# Patient Record
Sex: FEMALE | Race: WHITE | Employment: FULL TIME | URBAN - METROPOLITAN AREA
[De-identification: names, ages, dates, MRNs, and addresses within clinical notes are randomized per-mention and may not be internally consistent; named-entity substitution may affect disease eponyms.]

---

## 2017-04-06 ENCOUNTER — EMPLOYEE WELLNESS (OUTPATIENT)
Dept: OTHER | Age: 37
End: 2017-04-06

## 2017-04-06 LAB
CHOLESTEROL, TOTAL: 173 MG/DL (ref 0–199)
GLUCOSE BLD-MCNC: 86 MG/DL (ref 70–99)
HDLC SERPL-MCNC: 69 MG/DL (ref 40–60)
LDL CHOLESTEROL CALCULATED: 92 MG/DL
TRIGL SERPL-MCNC: 62 MG/DL (ref 0–150)

## 2018-03-20 VITALS — BODY MASS INDEX: 27.76 KG/M2 | WEIGHT: 172 LBS

## 2018-04-12 ENCOUNTER — EMPLOYEE WELLNESS (OUTPATIENT)
Dept: OTHER | Age: 38
End: 2018-04-12

## 2018-04-12 LAB
CHOLESTEROL, TOTAL: 207 MG/DL (ref 0–199)
GLUCOSE BLD-MCNC: 79 MG/DL (ref 70–99)
HDLC SERPL-MCNC: 70 MG/DL (ref 40–60)
LDL CHOLESTEROL CALCULATED: 124 MG/DL
TRIGL SERPL-MCNC: 67 MG/DL (ref 0–150)

## 2018-04-16 VITALS — WEIGHT: 181 LBS | BODY MASS INDEX: 29.21 KG/M2

## 2019-05-14 ENCOUNTER — EMPLOYEE WELLNESS (OUTPATIENT)
Dept: OTHER | Age: 39
End: 2019-05-14

## 2019-05-14 LAB
CHOLESTEROL, TOTAL: 184 MG/DL (ref 0–199)
GLUCOSE BLD-MCNC: 97 MG/DL (ref 70–99)
HDLC SERPL-MCNC: 60 MG/DL (ref 40–60)
LDL CHOLESTEROL CALCULATED: 112 MG/DL
TRIGL SERPL-MCNC: 62 MG/DL (ref 0–150)

## 2019-05-28 VITALS — BODY MASS INDEX: 29.05 KG/M2 | WEIGHT: 180 LBS

## 2020-03-23 ENCOUNTER — OFFICE VISIT (OUTPATIENT)
Dept: ORTHOPEDIC SURGERY | Age: 40
End: 2020-03-23
Payer: COMMERCIAL

## 2020-03-23 VITALS — TEMPERATURE: 98.6 F | HEIGHT: 65 IN | WEIGHT: 187 LBS | BODY MASS INDEX: 31.16 KG/M2

## 2020-03-23 PROCEDURE — 99203 OFFICE O/P NEW LOW 30 MIN: CPT | Performed by: ORTHOPAEDIC SURGERY

## 2020-03-23 PROCEDURE — L1902 AFO ANKLE GAUNTLET PRE OTS: HCPCS | Performed by: ORTHOPAEDIC SURGERY

## 2020-03-23 RX ORDER — NAPROXEN 500 MG/1
500 TABLET ORAL 2 TIMES DAILY WITH MEALS
Qty: 60 TABLET | Refills: 0 | Status: SHIPPED | OUTPATIENT
Start: 2020-03-23 | End: 2020-04-22

## 2020-03-23 ASSESSMENT — ENCOUNTER SYMPTOMS
ALLERGIC/IMMUNOLOGIC NEGATIVE: 1
GASTROINTESTINAL NEGATIVE: 1
COUGH: 1
EYES NEGATIVE: 1

## 2020-03-23 NOTE — PROGRESS NOTES
Subjective:      Patient ID: Juan Fitzgerald is a 44 y.o. female. DAVID Fitzgerald presents today for evaluation of a left ankle injury. She was injured in January. She rolled her left ankle. She used some ice and an Ace wrap initially. She has not taken any medicine for this. She occasionally limps when she gets up. She says it feels like her ankle needs to pop. She has lateral pain and swelling. She tried to work out and cannot do that. In January pain was 7 out of 10. Currently pain is 4-5 out of 10 with activity but she has no pain at rest.  She has a history of a left foot fracture in 2010. She works in the [employer] at [employer].  She is otherwise healthy. Review of Systems   Constitutional: Negative. HENT: Negative. Eyes: Negative. Respiratory: Positive for cough. Cardiovascular: Negative. Gastrointestinal: Negative. Endocrine: Negative. Genitourinary: Negative. Musculoskeletal: Positive for arthralgias. Left ankle pain    Skin: Negative. Allergic/Immunologic: Negative. Neurological: Negative. Hematological: Negative. Psychiatric/Behavioral: Negative. Objective:   Physical Exam  General Exam:    Vitals: Temperature 98.6 °F (37 °C), temperature source Temporal, height 5' 5\" (1.651 m), weight 187 lb (84.8 kg). Constitutional: Patient is adequately groomed with no evidence of malnutrition  Mental Status: The patient is oriented to time, place and person. The patient's mood and affect are appropriate. Gait:  Patient walks with slight hesitation in her gait. Lymphatic: The lymphatic examination bilaterally reveals all areas to be without enlargement or induration. Vascular: Examination reveals no swelling or calf tenderness. Peripheral pulses are palpable and 2+. Neurological: The patient has good coordination. There is no weakness or sensory deficit. Skin:    Head/Neck: inspection reveals no rashes, ulcerations or lesions.   Trunk: inspection reveals no rashes, ulcerations or lesions. Right Lower Extremity: inspection reveals no rashes, ulcerations or lesions. Left Lower Extremity: inspection reveals no rashes, ulcerations or lesions. Right ankle exam today he has no tenderness or swelling. She has full range of motion. She has no pain with single leg stance or toe raise. She has normal strength. Neurologic and vascular exams are normal.  Calf is soft. On the left side she has fullness laterally. She has some mild tenderness over the ATFL and mild tenderness over the peroneal tendons. She has mild discomfort posteriorly near the Achilles. She has difficulty with single-leg toe raise. He is moderate discomfort with anterior drawer testing but no gross instability. Calf is soft. Neurologic and vascular exams are normal.    Left ankle x-rays obtained today AP, lateral, and mortise views demonstrate: No bony abnormalities    Assessment:      Residual of left ankle sprain      Plan:      She will take Naprosyn for the week. She will be fit with a brace. She was instructed in home exercises. She understands that she will likely have some swelling for several more months. To follow-up with me on an as-needed basis. Procedures    ASO Jewels Ankle Brace     Patient was prescribed an ASO JEWELS Ankle Brace. The left ankle will require stabilization / immobilization from this semi-rigid / rigid orthosis to improve their function. The orthosis will assist in protecting the affected area, provide functional support and facilitate healing. Patient was instructed to progress ambulation weight bearing as tolerated in the device. The patient was educated and fit by a healthcare professional with expert knowledge and specialization in brace application while under the direct supervision of the treating physician. Verbal and written instructions for the use of and application of this item were provided.    They were instructed to contact the office immediately should the brace result in increased pain, decreased sensation, increased swelling or worsening of the condition. This note was created using voice recognition software. It has been proofread, but occasionally errors remain. Please disregard these errors. They will be corrected as they are noted.

## 2020-07-06 ENCOUNTER — HOSPITAL ENCOUNTER (OUTPATIENT)
Dept: WOMENS IMAGING | Age: 40
Discharge: HOME | End: 2020-07-06
Payer: COMMERCIAL

## 2020-07-06 ENCOUNTER — OFFICE VISIT (OUTPATIENT)
Dept: PRIMARY CARE CLINIC | Age: 40
End: 2020-07-06
Payer: COMMERCIAL

## 2020-07-06 PROCEDURE — 77067 SCR MAMMO BI INCL CAD: CPT

## 2020-07-06 PROCEDURE — 99211 OFF/OP EST MAY X REQ PHY/QHP: CPT | Performed by: INTERNAL MEDICINE

## 2020-07-06 NOTE — PROGRESS NOTES
Christel Lyman received a viral test for COVID-19. They were educated on isolation and quarantine as appropriate. For any symptoms, they were directed to seek care from their PCP, given contact information to establish with a doctor, directed to an urgent care or the emergency room.

## 2020-07-08 LAB
SARS-COV-2: NOT DETECTED
SOURCE: NORMAL

## 2020-08-11 ENCOUNTER — EMPLOYEE WELLNESS (OUTPATIENT)
Dept: OTHER | Age: 40
End: 2020-08-11

## 2020-08-11 LAB
CHOLESTEROL, TOTAL: 194 MG/DL (ref 0–199)
GLUCOSE BLD-MCNC: 88 MG/DL (ref 70–99)
HDLC SERPL-MCNC: 55 MG/DL (ref 40–60)
LDL CHOLESTEROL CALCULATED: 129 MG/DL
TRIGL SERPL-MCNC: 48 MG/DL (ref 0–150)

## 2020-10-19 VITALS — BODY MASS INDEX: 30.79 KG/M2 | WEIGHT: 185 LBS

## 2021-08-12 LAB
CHOLESTEROL, TOTAL: 201 MG/DL (ref 0–199)
GLUCOSE BLD-MCNC: 92 MG/DL (ref 70–99)
HDLC SERPL-MCNC: 58 MG/DL (ref 40–60)
LDL CHOLESTEROL CALCULATED: 130 MG/DL
TRIGL SERPL-MCNC: 67 MG/DL (ref 0–150)

## 2021-09-08 ENCOUNTER — HOSPITAL ENCOUNTER (OUTPATIENT)
Dept: WOMENS IMAGING | Age: 41
Discharge: HOME | End: 2021-09-08
Payer: COMMERCIAL

## 2021-09-08 DIAGNOSIS — Z12.31 VISIT FOR SCREENING MAMMOGRAM: ICD-10-CM

## 2021-09-08 PROCEDURE — 77063 BREAST TOMOSYNTHESIS BI: CPT

## 2021-10-18 NOTE — PROGRESS NOTES
[address]    H+P // CONSULT // OUTPATIENT VISIT // Jose Maiers TYPE New     CHIEF COMPLAINT     TYPE Acute   SX None   PROBS Family hx heart disease (father)      HISTORY OF PRESENT ILLNESS     GEN New patient for increased cardiac risk. Denies symptoms. Active. Strong family hx of CAD. CAD Denies cp, sob, dizziness, syncope, palpitations   HTN Ambulatory BP in good range, no ha or dizziness. CHOL Last chol in elevated range, not on therapy   MED Compliant with CV meds listed below without reported side effects. HISTORY/ALLERGIES/ROS     MedHx:  has a past medical history of Anxiety. SurgHx:  has a past surgical history that includes  section and laparoscopic appendectomy (10-8-13). SocHx:  reports that she has never smoked. She has never used smokeless tobacco. She reports current alcohol use. She reports that she does not use drugs. FamHx: family history includes Diabetes in her mother; Heart Disease in her father. Allerg: Sulfa antibiotics   ROS: [x]Full ROS obtained and negative except as mentioned in HPI     MEDICATIONS      Current Outpatient Medications   Medication Sig Dispense Refill    naproxen (NAPROSYN) 500 MG tablet Take 1 tablet by mouth 2 times daily (with meals) 60 tablet 0    Norgestim-Eth Estrad Triphasic (TRINESSA, 28,) 0.18/0.215/0.25 MG-35 MCG TABS Take  by mouth.  fluvoxaMINE (LUVOX) 100 MG tablet Take 100 mg by mouth nightly.  busPIRone (BUSPAR) 5 MG tablet Take 5 mg by mouth daily. No current facility-administered medications for this visit.      Reviewed with patient and will remain unchanged except as mentioned in A/P  PHYSICAL EXAM     Vitals:    10/21/21 1250   BP: 118/72   Pulse: 74   SpO2: 99%      Gen Alert, coop, no distress Heart  Rrr, no mrg   Head NC, AT, no abnorm Abd  Soft, NT, +BS, no mass, no OM   Eyes PER, conj/corn clear Ext  Ext nl, AT, no C/C/E   Nose Nares nl, no drain, NT Pulse 2+ and symmetric   Throat Lips, mucosa, tongue nl Skin Col/text/turg nl, no vis rash/les   Neck S/S, TM, NT, no bruit/JVD Psych Nl mood and affect   Lung CTA-B, unlabored, no DTP Lymph   No cervical or axillary LA   Ch wall NT, no deform Neuro  Nl gross M/S exam     ASSESSMENT AND PLAN     * Family hx of heart disease    Date EF Results   Sx   No concerning symptom   Data   Most recent EKG, ECHO and LHC reviewed personally   LHC   None   MPI   None   TTE   None   Plan   Continue aggressive medical treatment at doses above   *CHOL  Status  uncontrolled with last LDL of 130 (goal <70) and HDL of 58 (8/21), labs reviewed personally  Plan Counseled on diet/exercise/weight   With marked family hx, recommend low dose crestor 5mg  *COMPLIANCE  Status Compliant  Plan Discussed importance of compliance with meds/diet/salt/exercise; avoid tob/alc/drugs; patient verbalized understanding  *FOLLOWUP  1-2 years    Scribe's attestation: This note was scribed in the presence of Sander May M.D. by Arias Chapman RN   Provider Danielle Bird is working as a scribe for and in the presence of cedric Bass MD). Working as a scribe, Chiquita Hall may have prepopulated components of this note with my historical  intellectual property under my direct supervision. Any additions to this intellectual property were performed in my presence and at my direction. Furthermore, the content and accuracy of this note have been reviewed by cedric Bass MD).   10/21/2021 12:50 PM

## 2021-10-18 NOTE — PATIENT INSTRUCTIONS
1.  Would like LDL lower than 130. Options to lower LDL are dietary modification vs starting low dose statin  2. Recommend Rosuvastatin 5 mg daily to help reduce lipids and stabilize plaque you may have in coronary arteries or other arteries in body. Watch to muscle aching as a side effect, but do not expect side effects with this low dose. Recheck fasting lipids and liver enzymes in two months. 3.  Do not recommend baby aspirin for primary prevention. 4.  Prefer to get to ideal body weight  5. Regular exercise. Goal is at least 150 minutes per week  6. Watch for concerning symptoms (chest pain, shortness of breath, decreased exercise intolerance). Will likely order diagnostic testing at that point. 7.  Follow up in two years with Dr. Demond Mcbride.   Samantha CARRASCO is his nurse

## 2021-10-21 ENCOUNTER — OFFICE VISIT (OUTPATIENT)
Dept: CARDIOLOGY CLINIC | Age: 41
End: 2021-10-21
Payer: COMMERCIAL

## 2021-10-21 VITALS
HEIGHT: 65 IN | OXYGEN SATURATION: 99 % | DIASTOLIC BLOOD PRESSURE: 72 MMHG | SYSTOLIC BLOOD PRESSURE: 118 MMHG | HEART RATE: 74 BPM | BODY MASS INDEX: 29.87 KG/M2 | WEIGHT: 179.3 LBS

## 2021-10-21 DIAGNOSIS — E78.5 HYPERLIPIDEMIA, UNSPECIFIED HYPERLIPIDEMIA TYPE: ICD-10-CM

## 2021-10-21 DIAGNOSIS — Z82.49 FAMILY HISTORY OF HEART DISEASE: Primary | ICD-10-CM

## 2021-10-21 PROCEDURE — 93000 ELECTROCARDIOGRAM COMPLETE: CPT | Performed by: INTERNAL MEDICINE

## 2021-10-21 PROCEDURE — 99999 PR OFFICE/OUTPT VISIT,PROCEDURE ONLY: CPT | Performed by: INTERNAL MEDICINE

## 2021-10-21 RX ORDER — ROSUVASTATIN CALCIUM 5 MG/1
5 TABLET, COATED ORAL DAILY
Qty: 90 TABLET | Refills: 3 | Status: SHIPPED | OUTPATIENT
Start: 2021-10-21

## 2021-12-10 ENCOUNTER — OFFICE VISIT (OUTPATIENT)
Dept: ENT CLINIC | Age: 41
End: 2021-12-10
Payer: COMMERCIAL

## 2021-12-10 VITALS — HEIGHT: 65 IN | WEIGHT: 182 LBS | BODY MASS INDEX: 30.32 KG/M2

## 2021-12-10 DIAGNOSIS — H69.83 DYSFUNCTION OF BOTH EUSTACHIAN TUBES: ICD-10-CM

## 2021-12-10 DIAGNOSIS — H65.193 OTHER ACUTE NONSUPPURATIVE OTITIS MEDIA OF BOTH EARS, RECURRENCE NOT SPECIFIED: Primary | ICD-10-CM

## 2021-12-10 PROCEDURE — 99204 OFFICE O/P NEW MOD 45 MIN: CPT | Performed by: OTOLARYNGOLOGY

## 2021-12-10 RX ORDER — FLUTICASONE PROPIONATE 50 MCG
1 SPRAY, SUSPENSION (ML) NASAL DAILY
Qty: 32 G | Refills: 1 | Status: SHIPPED | OUTPATIENT
Start: 2021-12-10

## 2021-12-10 NOTE — PROGRESS NOTES
[place]      Patient Name: Joel Alarcon Record Number:  [id number]  Primary Care Physician:  Tran Earl  Date of Consultation: 12/10/2021    Chief Complaint: Ear issues        HISTORY OF PRESENT ILLNESS  Michelle Oneill is a(n) 39 y.o. female who presents for evaluation of ear issues. The patient had an acute otitis media in both ears about 3 weeks ago. She was treated with Augmentin. This did improve the pain, but she still having discomfort worse on the left and right. She feels as though her hearing is diminished worse on the left than right. She cannot really pop her ears. She has had a couple of other ear infections as an adult, but they did not linger this long. Other than the ear she does not have any other upper respiratory problems. She has been trying to use Claritin, but does not think that it helped. She had a set of ear tubes as a kid. A lot of family members have ear tubes. REVIEW OF SYSTEMS  As above  PHYSICAL EXAM  GENERAL: No Acute Distress, Alert and Oriented, no Hoarseness, strong voice  EYES: EOMI, Anti-icteric  HENT:   Head: Normocephalic and atraumatic. Face:  Symmetric, facial nerve intact, no sinus tenderness   ears: See below  Mouth/Oral Cavity:  normal lips, Uvula is midline, no mucosal lesions  Oropharynx/Larynx:  normal oropharyn  Nose:Normal external nasal appearance. NECK: Normal range of motion, no thyromegaly, trachea is midline, no lymphadenopathy, no neck masses, no crepitus  CHEST: Normal respiratory effort, no retractions, breathing comfortably  SKIN: No rashes, normal appearing skin, no evidence of skin lesions/tumors  Neuro:  cranial nerve II-XII intact; normal gait  Cardio:  no edema    PROCEDURE    Bilateral ear exam  Right ear is visualized binoculars scope. Tympanic membrane is intact, very mildly retracted.  Middle ear appears to be aerated    On the left side again tympanic membrane is intact. It appears to be a bit retracted. I do not appreciate an obvious effusion. Kim did lateralized to the left side. ASSESSMENT/PLAN  1. Other acute nonsuppurative otitis media of both ears, recurrence not specified  I think this patient was appropriately treated for an acute otitis media a few weeks ago. Explained to the patient that sometimes eustachian tube dysfunction will linger for several weeks after a bad ear infection. I started her on Flonase. Of asked her to try Valsalva in a few times a day to see whether or not she can pop her ears. She is able to start popping them I think that it is a sign that the eustachian tube dysfunction is resolving. I like to see her again in about 6 weeks with an audiogram. If she still having symptoms and evidence of negative pressure on the tympanogram or hearing loss we may consider an ear tube. 2. Dysfunction of both eustachian tubes  As above             I have performed a head and neck physical exam personally or was physically present during the key or critical portions of the service. This note was generated completely or in part utilizing Dragon dictation speech recognition software. Occasionally, words are mistranscribed and despite editing, the text may contain inaccuracies due to incorrect word recognition. If further clarification is needed please contact the office at [phone].

## 2022-01-14 ENCOUNTER — TELEPHONE (OUTPATIENT)
Dept: CARDIOLOGY CLINIC | Age: 42
End: 2022-01-14

## 2022-01-14 DIAGNOSIS — E78.5 HYPERLIPIDEMIA, UNSPECIFIED HYPERLIPIDEMIA TYPE: ICD-10-CM

## 2022-01-14 LAB
ALT SERPL-CCNC: 15 U/L (ref 10–40)
AST SERPL-CCNC: 17 U/L (ref 15–37)
CHOLESTEROL, TOTAL: 136 MG/DL (ref 0–199)
HDLC SERPL-MCNC: 48 MG/DL (ref 40–60)
LDL CHOLESTEROL CALCULATED: 79 MG/DL
TRIGL SERPL-MCNC: 45 MG/DL (ref 0–150)
VLDLC SERPL CALC-MCNC: 9 MG/DL

## 2022-01-14 NOTE — TELEPHONE ENCOUNTER
----- Message from Nguyen Pathak RN sent at 1/14/2022  3:46 PM EST -----  Per Dr. Amparo Subramanian - labs look great. Continue Rosuvastatin 5 mg daily.

## 2022-01-20 NOTE — PROGRESS NOTES
Winston Mo   1980, 39 y.o. female   [id number]       Referring Provider: Krishna Abraham MD  Referral Type: In an order in [address] [place] [address]    Reason for Visit: Evaluation of suspected change in hearing, tinnitus, or balance. ADULT AUDIOLOGIC EVALUATION      Winston Mo is a 39 y.o. female seen today, 1/24/2022 , for an initial audiologic evaluation. Patient was seen by Krishna Abraham MD following today's evaluation. AUDIOLOGIC AND OTHER PERTINENT MEDICAL HISTORY:      Winston Mo noted aural fullness and family history of hearing loss. Patient reports bilateral pressure. Patient reports her father has a history of hearing loss. Winston Mo denied otalgia, otorrhea, tinnitus, dizziness, imbalance, history of falls, history of significant noise exposure, history of head trauma and history of ear surgery. Date: 1/24/2022     IMPRESSIONS:      AU: Hearing WNL, Excellent WRS, Type A tymp    Test results consistent with hearing within normal limits. Discussed test results with patient. Patient to follow medical recommendations per Krishna Abraham MD .    ASSESSMENT AND FINDINGS:     Otoscopy revealed: Clear ear canals bilaterally    RIGHT EAR:  Hearing Sensitivity: Normal hearing sensitivity   Speech Recognition Threshold: 10 dB HL  Word Recognition:Excellent (100%), based on NU-6 25-word list at 50 dBHL using recorded speech stimuli. Tympanometry: Normal peak pressure and compliance, Type A tympanogram, consistent with normal middle ear function. Acoustic Reflexes: Ipsilateral: Did not test. Contralateral: Did not test.    LEFT EAR:  Hearing Sensitivity: Normal hearing sensitivity   Speech Recognition Threshold: 10 dB HL  Word Recognition:Excellent (100%), based on NU-6 25-word list at 50 dBHL using recorded speech stimuli. Tympanometry: Normal peak pressure and compliance, Type A tympanogram, consistent with normal middle ear function.   Acoustic Reflexes: Ipsilateral: Did not test. Contralateral: Did not test.    Reliability: Good  Transducer: Inserts    See scanned audiogram dated 1/24/2022  for results. PATIENT EDUCATION:     The following items were discussed with the patient:   - Good Communication Strategies    Educational information was shared in the After Visit Summary. RECOMMENDATIONS:                                                                                                                                                                                                                                                          The following items are recommended based on patient report and results from today's appointment:   - Continue medical follow-up with Tess Robledo MD.   - Retest hearing as medically indicated and/or sooner if a change in hearing is noted. - Utilize \"Good Communication Strategies\" as discussed to assist in speech understanding with communication partners.        Angie Stein  Audiologist    Chart CC'd to: Tess Robledo MD      Degree of   Hearing Sensitivity dB Range   Within Normal Limits (WNL) 0 - 20   Mild 20 - 40   Moderate 40 - 55   Moderately-Severe 55 - 70   Severe 70 - 90   Profound 90 +

## 2022-01-24 ENCOUNTER — PROCEDURE VISIT (OUTPATIENT)
Dept: AUDIOLOGY | Age: 42
End: 2022-01-24
Payer: COMMERCIAL

## 2022-01-24 ENCOUNTER — OFFICE VISIT (OUTPATIENT)
Dept: ENT CLINIC | Age: 42
End: 2022-01-24
Payer: COMMERCIAL

## 2022-01-24 VITALS
BODY MASS INDEX: 30.12 KG/M2 | WEIGHT: 180.8 LBS | DIASTOLIC BLOOD PRESSURE: 89 MMHG | SYSTOLIC BLOOD PRESSURE: 129 MMHG | HEIGHT: 65 IN | HEART RATE: 71 BPM

## 2022-01-24 DIAGNOSIS — H91.90 HEARING LOSS, UNSPECIFIED HEARING LOSS TYPE, UNSPECIFIED LATERALITY: Primary | ICD-10-CM

## 2022-01-24 DIAGNOSIS — U07.1 COVID: ICD-10-CM

## 2022-01-24 DIAGNOSIS — H69.83 DYSFUNCTION OF BOTH EUSTACHIAN TUBES: Primary | ICD-10-CM

## 2022-01-24 DIAGNOSIS — H92.03 OTALGIA OF BOTH EARS: Primary | ICD-10-CM

## 2022-01-24 PROCEDURE — 92557 COMPREHENSIVE HEARING TEST: CPT | Performed by: AUDIOLOGIST

## 2022-01-24 PROCEDURE — 92567 TYMPANOMETRY: CPT | Performed by: AUDIOLOGIST

## 2022-01-24 PROCEDURE — 99213 OFFICE O/P EST LOW 20 MIN: CPT | Performed by: OTOLARYNGOLOGY

## 2022-01-24 NOTE — PROGRESS NOTES
have been set back a little bit in her recovery because of Covid as this can certainly cause some eustachian tube dysfunction as well. I do not feel as though something like an ear tube would be warranted given how good her hearing is. She will continue Flonase for now. 2. COVID  Patient is having the subjective nasal congestion and loss of smell. It has only been about 3 weeks. I would expect that she will make a full recovery from this             I have performed a head and neck physical exam personally or was physically present during the key or critical portions of the service. This note was generated completely or in part utilizing Dragon dictation speech recognition software. Occasionally, words are mistranscribed and despite editing, the text may contain inaccuracies due to incorrect word recognition. If further clarification is needed please contact the office at [phone].

## 2022-01-24 NOTE — PATIENT INSTRUCTIONS
Good Communication Strategies    Communication can be challenging for anyone, but can be especially difficult for those with some degree of hearing loss. While we may not be able to control every factor that may lead to difficulty with communication, there are Good Communication Strategies that we can all use in our day-to-day lives. Communication takes both parties working together for it to be successful. Tips as a Listener:   1. Control your environment. It is important to limit the amount of background noise in the room when possible. You should also consider having a good light source in the room to best see the other person. 2. Ask for clarification. Instead of saying \"What?\", you can use parts of what you heard to make a new question. For example, if you heard the word \"Thursday\" but not the rest of the week, you may ask \"What was that about Thursday? \" or \"What did you want to do Thursday? \". This shows the person talking that you are listening and will help them better explain what they are saying. 3. Be an advocate for yourself. If you are hearing but not understanding, tell the other person \"I can hear you, but I need you to slow down when you speak. \"  Or if someone is facing the other direction, say \"I cannot hear you when you are not looking at me when we talk. \"       Tips as a Talker:   - Sit or stand 3 to 6 feet away to maximize audibility         -- It is unrealistic to believe someone else will fully hear your message if you are speaking from across the room or in a different room in the house   - Stay at eye level to help with visual cues   - Make sure you have the persons attention before speaking   - Use facial expressions and gestures to accentuate your message   - Raise your voice slightly (do not scream)   - Speak slowly and distinctly   - Use short, simple sentences   - Rephrase your words if the person is having a hard time understanding you    - To avoid distortion, dont speak directly into a persons ear      Some additional items that may be helpful:   - Remain patient - this is important for both parties   - Write down items that still cannot be heard/understood. You may write with pen/paper or consider typing/texting on a cell phone or smart device. - If background noise is unavoidable, try to keep yourself in a good position in the room. By sitting at a dexter on the side of the restaurant (preferably a corner), it will be easier to communicate than if you were sitting at a table in the middle with background noise surrounding you. Keep yourself positioned away from music speakers or heavy foot traffic.

## 2022-07-07 LAB
CHOLESTEROL, TOTAL: 129 MG/DL (ref 0–199)
GLUCOSE BLD-MCNC: 86 MG/DL (ref 70–99)
HDLC SERPL-MCNC: 62 MG/DL (ref 40–60)
LDL CHOLESTEROL CALCULATED: 56 MG/DL
TRIGL SERPL-MCNC: 56 MG/DL (ref 0–150)

## 2022-10-17 ENCOUNTER — HOSPITAL ENCOUNTER (OUTPATIENT)
Dept: WOMENS IMAGING | Age: 42
Discharge: HOME | End: 2022-10-17
Payer: COMMERCIAL

## 2022-10-17 DIAGNOSIS — Z12.31 BREAST CANCER SCREENING BY MAMMOGRAM: ICD-10-CM

## 2022-10-17 PROCEDURE — 77063 BREAST TOMOSYNTHESIS BI: CPT

## 2023-01-31 NOTE — TELEPHONE ENCOUNTER
Last office visit: 10.21.21 DCE   Last Labs: 03.22.22   Next office visit: 10.21.23 DCE on the recall list

## 2023-02-01 RX ORDER — ROSUVASTATIN CALCIUM 5 MG/1
TABLET, COATED ORAL
Qty: 90 TABLET | Refills: 3 | Status: SHIPPED | OUTPATIENT
Start: 2023-02-01

## 2023-10-30 ENCOUNTER — HOSPITAL ENCOUNTER (OUTPATIENT)
Dept: WOMENS IMAGING | Age: 43
Discharge: HOME | End: 2023-10-30
Payer: COMMERCIAL

## 2023-10-30 VITALS — BODY MASS INDEX: 30.82 KG/M2 | WEIGHT: 185 LBS | HEIGHT: 65 IN

## 2023-10-30 DIAGNOSIS — Z12.31 BREAST CANCER SCREENING BY MAMMOGRAM: ICD-10-CM

## 2023-10-30 PROCEDURE — 77063 BREAST TOMOSYNTHESIS BI: CPT

## 2023-11-09 ENCOUNTER — OFFICE VISIT (OUTPATIENT)
Dept: CARDIOLOGY CLINIC | Age: 43
End: 2023-11-09
Payer: COMMERCIAL

## 2023-11-09 VITALS
HEART RATE: 75 BPM | DIASTOLIC BLOOD PRESSURE: 86 MMHG | OXYGEN SATURATION: 98 % | WEIGHT: 190.2 LBS | HEIGHT: 65 IN | SYSTOLIC BLOOD PRESSURE: 128 MMHG | BODY MASS INDEX: 31.69 KG/M2

## 2023-11-09 DIAGNOSIS — Z82.49 FAMILY HISTORY OF CORONARY ARTERY DISEASE: Primary | ICD-10-CM

## 2023-11-09 DIAGNOSIS — E78.00 HYPERCHOLESTEROLEMIA: ICD-10-CM

## 2023-11-09 PROCEDURE — 99213 OFFICE O/P EST LOW 20 MIN: CPT | Performed by: INTERNAL MEDICINE

## 2024-04-16 RX ORDER — ROSUVASTATIN CALCIUM 5 MG/1
5 TABLET, COATED ORAL DAILY
Qty: 90 TABLET | Refills: 3 | Status: SHIPPED | OUTPATIENT
Start: 2024-04-16

## 2024-04-16 NOTE — TELEPHONE ENCOUNTER
Requested Prescriptions     Pending Prescriptions Disp Refills    rosuvastatin (CRESTOR) 5 MG tablet 90 tablet 3     Sig: Take 1 tablet by mouth daily      [facility]      Last OV:  11/9/2023 DCE     Next OV: Visit date not found     Last Labs: 03/07/2024 LIPID    Last Filled: 02/014/2023 DCE

## 2024-04-22 RX ORDER — ROSUVASTATIN CALCIUM 5 MG/1
5 TABLET, COATED ORAL DAILY
Qty: 90 TABLET | Refills: 3 | Status: CANCELLED | OUTPATIENT
Start: 2024-04-22

## 2024-04-22 NOTE — TELEPHONE ENCOUNTER
Requested Prescriptions     Pending Prescriptions Disp Refills    rosuvastatin (CRESTOR) 5 MG tablet 90 tablet 3     Sig: Take 1 tablet by mouth daily        [facility]     Last OV:  11/9/2023 DCE    Next OV: Visit date not found     Last Labs: 03/07/2024 Lipid    Last Filled: 04/16/2024 DCE

## 2024-04-22 NOTE — TELEPHONE ENCOUNTER
Pt called to check on when the script will be sent.  Please forward script to [facility] - [place], [place] - [address] - P [phone] - F [phone]

## 2024-04-22 NOTE — TELEPHONE ENCOUNTER
Medication Refill    Medication needing refilled:  rosuvastatin     Dosage of the medication:  5mg    How are you taking this medication (QD, BID, TID, QID, PRN):  Take 1 tablet by mouth daily     30 or 90 day supply called in:   90    When will you run out of your medication:    Which Pharmacy are we sending the medication to?:    [facility]   [address] [place] [address] [address] [place], [place]  [address]  Phone:  [phone]  Fax:  [phone]

## 2024-04-23 NOTE — TELEPHONE ENCOUNTER
Received refill request for Rosuvastatin Calcium 5mg tablets from [facility] pharmacy.     Last OV: 11-9-2023 DCE    Next OV: None.     Last Labs: 3-7-2024 Lipid    Last Filled: 4- DCE

## 2024-04-25 RX ORDER — ROSUVASTATIN CALCIUM 5 MG/1
5 TABLET, COATED ORAL DAILY
Qty: 90 TABLET | Refills: 3 | Status: SHIPPED | OUTPATIENT
Start: 2024-04-25

## 2024-08-20 LAB
CHOLEST SERPL-MCNC: 172 MG/DL (ref 0–199)
GLUCOSE SERPL-MCNC: 86 MG/DL (ref 70–99)
HDLC SERPL-MCNC: 55 MG/DL (ref 40–60)
LDLC SERPL CALC-MCNC: 109 MG/DL
TRIGL SERPL-MCNC: 41 MG/DL (ref 0–150)

## 2024-11-11 ENCOUNTER — HOSPITAL ENCOUNTER (OUTPATIENT)
Dept: WOMENS IMAGING | Age: 44
Discharge: HOME | End: 2024-11-11
Payer: COMMERCIAL

## 2024-11-11 VITALS — BODY MASS INDEX: 30.82 KG/M2 | HEIGHT: 65 IN | WEIGHT: 185 LBS

## 2024-11-11 DIAGNOSIS — Z12.31 BREAST CANCER SCREENING BY MAMMOGRAM: ICD-10-CM

## 2024-11-11 PROCEDURE — 77067 SCR MAMMO BI INCL CAD: CPT

## 2024-12-03 ENCOUNTER — TELEPHONE (OUTPATIENT)
Dept: CARDIOLOGY CLINIC | Age: 44
End: 2024-12-03

## 2024-12-03 NOTE — TELEPHONE ENCOUNTER
VVO Dr. Ghotra    Medrol dose pack to [facility] in [place].    Take as directed.      Left voicemail message for prescription.

## 2025-06-06 LAB
CHOLEST SERPL-MCNC: 126 MG/DL (ref 0–199)
GLUCOSE SERPL-MCNC: 89 MG/DL (ref 70–99)
HDLC SERPL-MCNC: 54 MG/DL (ref 40–60)
LDLC SERPL CALC-MCNC: 63 MG/DL
TRIGL SERPL-MCNC: 47 MG/DL (ref 0–150)

## 2025-07-15 RX ORDER — ROSUVASTATIN CALCIUM 5 MG/1
5 TABLET, COATED ORAL DAILY
Qty: 90 TABLET | Refills: 3 | Status: SHIPPED | OUTPATIENT
Start: 2025-07-15

## 2025-07-15 NOTE — TELEPHONE ENCOUNTER
Medication Requested: Rosuvastatin   Preferred Pharmacy: [facility]  Last OV: 2023 DCE  Next OV: 2025 DCE  Labs/[id number] Lipid and 2025 CMP